# Patient Record
Sex: MALE | Race: OTHER
[De-identification: names, ages, dates, MRNs, and addresses within clinical notes are randomized per-mention and may not be internally consistent; named-entity substitution may affect disease eponyms.]

---

## 2020-11-04 ENCOUNTER — HOSPITAL ENCOUNTER (OUTPATIENT)
Dept: HOSPITAL 54 - DS | Age: 70
Discharge: HOME | End: 2020-11-04
Attending: SPECIALIST
Payer: COMMERCIAL

## 2020-11-04 DIAGNOSIS — G56.02: Primary | ICD-10-CM

## 2020-11-04 PROCEDURE — A6402 STERILE GAUZE <= 16 SQ IN: HCPCS

## 2020-11-04 PROCEDURE — 64721 CARPAL TUNNEL SURGERY: CPT

## 2020-11-04 PROCEDURE — G0378 HOSPITAL OBSERVATION PER HR: HCPCS

## 2020-11-04 NOTE — NUR
EXIT CARE AND DISCHARGE INSTRUCTIONS PROVIDED TO PT.  ACCOMPANIED TO HOSPITAL LOBBY AND MET 
WITH SON. PT IS [place] SPEAKING. SON INFORMED OF DR CAPELLAN'S INSTRUCTIONS AS WELL AS THE 
REST OF DISCHARGE INSTRUCTIONS PERTAINING TO CARPAL TUNNEL SURGERY. PT SON VERBALIZED 
UNDERSTANDING. PT LEFT THE FACILITY IN STABLE CONDITION VIA PRIVATE CAR.

## 2020-11-04 NOTE — NUR
RECEIVED PT FROM OPERATING ROOM, SURGERY OF LEFT CARPAL TUNNEL DONE. PT IN STABLE CONDITION. 
NOT IN ANY ACUTE DISTRESS. UPON BEDSIDE REPORT FROM OR NURSE PT EKG READS ATRIAL 
FIBRILLATION. SPOKE TO DR CAPELLAN, AND STATED PT OK TO GO HOME, AND TO INFORM SON REGARDING 
EKG RESULTS, AND TO BRING UP TO PCP FOR FOLLOW UP AND FURTHER EVALUATION. NO SIGNS OF ANY 
DISTRESS R/T EKG READING OF A-FIB. PT WILL CALL FAMILY FOR . NO IV